# Patient Record
Sex: FEMALE | Race: WHITE | NOT HISPANIC OR LATINO | Employment: STUDENT | URBAN - METROPOLITAN AREA
[De-identification: names, ages, dates, MRNs, and addresses within clinical notes are randomized per-mention and may not be internally consistent; named-entity substitution may affect disease eponyms.]

---

## 2017-05-24 NOTE — TELEPHONE ENCOUNTER
"Pt requesting to have a refill on BC pills \"norgestimate-ethinyl estradiol (MONONESSA) 0.25-35\" until her annual on 7/11/17. Pharmacy at [facility] in [facility]. Thank you.    "

## 2017-05-25 RX ORDER — NORGESTIMATE AND ETHINYL ESTRADIOL 0.25-0.035
1 KIT ORAL
Qty: 28 TAB | Refills: 2 | OUTPATIENT
Start: 2017-05-25 | End: 2017-07-11 | Stop reason: SDUPTHER

## 2017-06-26 ENCOUNTER — TELEPHONE (OUTPATIENT)
Dept: OBGYN | Facility: CLINIC | Age: 22
End: 2017-06-26

## 2017-06-26 NOTE — TELEPHONE ENCOUNTER
Patient called and she needs to get her birth control. Please call patient once its done so she knows when to pick the RX up

## 2017-06-27 DIAGNOSIS — Z30.9 ENCOUNTER FOR CONTRACEPTIVE MANAGEMENT, UNSPECIFIED TYPE: ICD-10-CM

## 2017-06-27 RX ORDER — NORGESTIMATE AND ETHINYL ESTRADIOL 0.25-0.035
1 KIT ORAL
Qty: 28 TAB | Refills: 0 | Status: CANCELLED | OUTPATIENT
Start: 2017-06-27

## 2017-07-11 ENCOUNTER — GYNECOLOGY VISIT (OUTPATIENT)
Dept: OBGYN | Facility: CLINIC | Age: 22
End: 2017-07-11
Payer: COMMERCIAL

## 2017-07-11 ENCOUNTER — HOSPITAL ENCOUNTER (OUTPATIENT)
Facility: MEDICAL CENTER | Age: 22
End: 2017-07-11
Attending: OBSTETRICS & GYNECOLOGY
Payer: COMMERCIAL

## 2017-07-11 VITALS
WEIGHT: 149 LBS | SYSTOLIC BLOOD PRESSURE: 110 MMHG | BODY MASS INDEX: 25.44 KG/M2 | HEIGHT: 64 IN | DIASTOLIC BLOOD PRESSURE: 70 MMHG

## 2017-07-11 DIAGNOSIS — E03.9 HYPOTHYROIDISM, UNSPECIFIED TYPE: ICD-10-CM

## 2017-07-11 DIAGNOSIS — Z01.419 WELL WOMAN EXAM: ICD-10-CM

## 2017-07-11 DIAGNOSIS — N64.4 BREAST PAIN, RIGHT: ICD-10-CM

## 2017-07-11 DIAGNOSIS — Z11.51 SPECIAL SCREENING EXAMINATION FOR HUMAN PAPILLOMAVIRUS (HPV): ICD-10-CM

## 2017-07-11 DIAGNOSIS — Z12.4 SCREENING FOR CERVICAL CANCER: ICD-10-CM

## 2017-07-11 PROCEDURE — 88175 CYTOPATH C/V AUTO FLUID REDO: CPT

## 2017-07-11 PROCEDURE — 87491 CHLMYD TRACH DNA AMP PROBE: CPT

## 2017-07-11 PROCEDURE — 99395 PREV VISIT EST AGE 18-39: CPT | Performed by: OBSTETRICS & GYNECOLOGY

## 2017-07-11 PROCEDURE — 87591 N.GONORRHOEAE DNA AMP PROB: CPT

## 2017-07-11 RX ORDER — LEVOTHYROXINE SODIUM 0.12 MG/1
125 TABLET ORAL DAILY
Qty: 30 TAB | Refills: 3 | Status: SHIPPED | OUTPATIENT
Start: 2017-07-11

## 2017-07-11 RX ORDER — NORGESTIMATE AND ETHINYL ESTRADIOL 0.25-0.035
1 KIT ORAL
Qty: 28 TAB | Refills: 13 | Status: SHIPPED | OUTPATIENT
Start: 2017-07-11

## 2017-07-11 NOTE — MR AVS SNAPSHOT
"        Tami Burrows   2017 10:30 AM   Gynecology Visit   MRN: [id number]    Department:  [facility]   Dept Phone:  [phone]    Description:  Female : 1995   Provider:  Kate Cervantes M.D.           Reason for Visit     Annual Exam           Allergies as of 2017     Allergen Noted Reactions    Other Drug 10/13/2011   Rash    z gordy      You were diagnosed with     Well woman exam   [943230]       Screening for cervical cancer   [690448]       Special screening examination for human papillomavirus (HPV)   [V73.81.ICD-9-CM]       Hypothyroidism, unspecified type   [6379642]       Breast pain, right   [226960]         Vital Signs     Blood Pressure Height Weight Body Mass Index Last Menstrual Period Breastfeeding?    110/70 mmHg 1.626 m (5' 4.02\") 67.586 kg (149 lb) 25.56 kg/m2 2017 No    Smoking Status                   Never Smoker            Basic Information     Date Of Birth Sex Race Ethnicity Preferred Language    1995 Female White Non- English      Your appointments     Aug 01, 2017 12:45 PM   US BREAST with [facility] US 1   [facility] BREAST [facility] ([address])    [address]  [address]  [facility] [address] [phone]           No deodorant, powder, perfume or lotion under the arm or breast area.              Problem List              ICD-10-CM Priority Class Noted - Resolved    Pyelonephritis N12   2015 - Present      Health Maintenance        Date Due Completion Dates    IMM HEP B VACCINE (1 of 3 - Primary Series) 1995 ---    IMM HEP A VACCINE (1 of 2 - Standard Series) 10/24/1996 ---    IMM HPV VACCINE (1 of 3 - Female 3 Dose Series) 10/24/2006 ---    IMM VARICELLA (CHICKENPOX) VACCINE (1 of 2 - 2 Dose Adolescent Series) 10/24/2008 ---    IMM MENINGOCOCCAL VACCINE (MCV4) (1 of 1) 10/24/2011 ---    IMM DTaP/Tdap/Td Vaccine (1 - Tdap) 10/24/2014 ---    PAP SMEAR 10/24/2016 ---    IMM INFLUENZA (1) 2017 ---            "   Current Immunizations     Pneumococcal polysaccharide vaccine (PPSV-23) 10/1/2014      Below and/or attached are the medications your provider expects you to take. Review all of your home medications and newly ordered medications with your provider and/or pharmacist. Follow medication instructions as directed by your provider and/or pharmacist. Please keep your medication list with you and share with your provider. Update the information when medications are discontinued, doses are changed, or new medications (including over-the-counter products) are added; and carry medication information at all times in the event of emergency situations     Allergies:  OTHER DRUG - Rash               Medications  Valid as of: July 11, 2017 - 12:13 PM    Generic Name Brand Name Tablet Size Instructions for use    Albuterol   Inhale  by mouth as needed.        Calcium Citrate   Take  by mouth every evening.        Cetirizine HCl (Tab) ZYRTEC 10 MG Take 10 mg by mouth every evening.        Famotidine (Tab) PEPCID 20 MG Take 20 mg by mouth as needed.        Levothyroxine Sodium (Tab) SYNTHROID 125 MCG Take 1 Tab by mouth every day.        Norgestimate-Eth Estradiol (Tab) ORTHO-CYCLEN 0.25-35 MG-MCG Take 1 Tab by mouth every day.        Sucralfate (Suspension) CARAFATE 1 GM/10ML Take 10 mL by mouth 4 Times a Day,Before Meals and at Bedtime.        .                 Medicines prescribed today were sent to:     [facility] [address] [place] [address] [address] [facility]    [address] [facility] [address]    Phone: [phone] Fax: [phone]    Open 24 Hours?: Yes      Medication refill instructions:       If your prescription bottle indicates you have medication refills left, it is not necessary to call your provider’s office. Please contact your pharmacy and they will refill your medication.    If your prescription bottle indicates you do not have any refills left, you may request refills at any time  through one of the following ways: The online [employer] system (except Urgent Care), by calling your provider’s office, or by asking your pharmacy to contact your provider’s office with a refill request. Medication refills are processed only during regular business hours and may not be available until the next business day. Your provider may request additional information or to have a follow-up visit with you prior to refilling your medication.   *Please Note: Medication refills are assigned a new Rx number when refilled electronically. Your pharmacy may indicate that no refills were authorized even though a new prescription for the same medication is available at the pharmacy. Please request the medicine by name with the pharmacy before contacting your provider for a refill.        Your To Do List     Future Labs/Procedures Complete By Expires    THINPREP RFLX HPV ASCUS W/CTNG  As directed 7/11/2018    TSH  As directed 7/11/2018    US-BREAST COMPLETE-RIGHT  As directed 7/11/2018      Referral     A referral request has been sent to our patient care coordination department. Please allow 3-5 business days for us to process this request and contact you either by phone or mail. If you do not hear from us by the 5th business day, please call us at [phone].           [employer] Access Code: Activation code not generated  Current [employer] Status: Active

## 2017-07-11 NOTE — PROGRESS NOTES
"Tami Burrows21 y.o.  female presents for Annual Well Woman Exam.   Patient is currently without complaints. Patient is   Having normal meses with last menstrual period 2 weeks ago.  regular q month without intermenstrual spotting., Flow is light, with minimal cramping      Patient is complaining of tenderness in the right breast. She states this is been going on for 3-4 months she does not notice any distinct masses. She does not notice any relation to her menstrual cycle    ROS: Patient is feeling well. No dyspnea or chest pain on exertion. No Abdominal pain, change in bowel habits, black or bloody stools. No urinary sx. GYN ROS:normal menses, no abnormal bleeding, pelvic pain or discharge. Positive breast symptoms:  tenderness on right upper outer quadrant No neurological complaints.  Past Medical History   Diagnosis Date   • Indigestion    • ASTHMA      exercised induced   • Unspecified disorder of thyroid    • Fibromyalgia    • Heart burn    • Pain 10/18/11     muscle pain -5/10     None  Allergy:   Other drug    LMP:       Patient's last menstrual period was 2017. .     Menstrual History: menses regular every 28 days      Pap history, the patient denies abnormal Pap smears and denies   sexually transmitted diseases    Mammo:too young    Objective : The patient appears well, alert and oriented x 3, in no acute distress.  Blood pressure 110/70, height 1.626 m (5' 4.02\"), weight 67.586 kg (149 lb), last menstrual period 2017, not currently breastfeeding.  HEENT Exam: EOMI, GIRISH, no adenopathy or thyromegaly.  Lungs: Clear to Auscultation   Cor: S1 and S2 normal, no murmurs, or rubs   Abdomen: Soft without tenderness, guarding mass or organomegaly.  Extremities: No edema, pulses equal  Neurological: Normal No focal signs  Breast Exam:negative findings: normal in size and symmetry, normal contour with no evidence of flattening or dimpling, skin normal, nipples everted without rashes or " discharge, on right side in area of concern there is some milligrams for dense breast tissue consistent with fibrocystic change  Pelvic: negative findings: external genitalia normal, Barth[place]'s glands, urethra, [place]'s glands negative, vaginal mucosa normal, cervix clear, normal sized uterus, adnexae negative    Lab:No results found for this or any previous visit (from the past 336 hour(s)).    Assessment:  well woman  no contraindication to continue use of oral contraceptives    Plan:pap smear  counseled on breast self exam and use and side effects of OCPs  return annually or prn      Patient has not had thyroid checked in greater than 2 years and has not been on thyroid medication because she ran out and did not go  back to the doctor  TSH is ordered, reorder levothyroxine  Refill oral contraceptives  GC and Chlamydia  Right breast ultrasound  Contraception:oral contraceptives

## 2017-07-13 LAB
C TRACH DNA GENITAL QL NAA+PROBE: NEGATIVE
CYTOLOGY REG CYTOL: NORMAL
N GONORRHOEA DNA GENITAL QL NAA+PROBE: NEGATIVE
SPECIMEN SOURCE: NORMAL

## 2017-07-14 ENCOUNTER — TELEPHONE (OUTPATIENT)
Dept: PEDIATRIC ENDOCRINOLOGY | Facility: MEDICAL CENTER | Age: 22
End: 2017-07-14

## 2017-07-14 NOTE — TELEPHONE ENCOUNTER
This patient was last seen in 2014 and is now 21 years old.  Please have her see an adult endo (or her PCP can manage)

## 2017-08-01 ENCOUNTER — HOSPITAL ENCOUNTER (OUTPATIENT)
Dept: RADIOLOGY | Facility: MEDICAL CENTER | Age: 22
End: 2017-08-01
Attending: OBSTETRICS & GYNECOLOGY
Payer: COMMERCIAL

## 2017-08-01 DIAGNOSIS — N64.4 BREAST PAIN, RIGHT: ICD-10-CM

## 2017-08-01 PROCEDURE — 76642 ULTRASOUND BREAST LIMITED: CPT | Mod: RT
